# Patient Record
Sex: MALE | Race: WHITE | HISPANIC OR LATINO | URBAN - NONMETROPOLITAN AREA
[De-identification: names, ages, dates, MRNs, and addresses within clinical notes are randomized per-mention and may not be internally consistent; named-entity substitution may affect disease eponyms.]

---

## 2023-08-09 ENCOUNTER — APPOINTMENT (OUTPATIENT)
Dept: RADIOLOGY | Facility: CLINIC | Age: 28
End: 2023-08-09
Payer: COMMERCIAL

## 2023-08-09 ENCOUNTER — OFFICE VISIT (OUTPATIENT)
Dept: URGENT CARE | Facility: CLINIC | Age: 28
End: 2023-08-09
Payer: COMMERCIAL

## 2023-08-09 ENCOUNTER — HOSPITAL ENCOUNTER (EMERGENCY)
Facility: HOSPITAL | Age: 28
Discharge: HOME | End: 2023-08-09
Attending: EMERGENCY MEDICINE

## 2023-08-09 VITALS
WEIGHT: 170 LBS | HEART RATE: 79 BPM | RESPIRATION RATE: 18 BRPM | OXYGEN SATURATION: 99 % | TEMPERATURE: 98.3 F | DIASTOLIC BLOOD PRESSURE: 68 MMHG | SYSTOLIC BLOOD PRESSURE: 124 MMHG

## 2023-08-09 VITALS
SYSTOLIC BLOOD PRESSURE: 124 MMHG | DIASTOLIC BLOOD PRESSURE: 71 MMHG | HEART RATE: 78 BPM | WEIGHT: 170 LBS | TEMPERATURE: 98.6 F

## 2023-08-09 DIAGNOSIS — S61.209A FINGER AVULSION, INITIAL ENCOUNTER: Primary | ICD-10-CM

## 2023-08-09 DIAGNOSIS — T14.8XXA AVULSION INJURY: Primary | ICD-10-CM

## 2023-08-09 DIAGNOSIS — S61.209A FINGER AVULSION, INITIAL ENCOUNTER: ICD-10-CM

## 2023-08-09 PROCEDURE — 90471 IMMUNIZATION ADMIN: CPT

## 2023-08-09 PROCEDURE — G0382 LEV 3 HOSP TYPE B ED VISIT: HCPCS

## 2023-08-09 PROCEDURE — 73140 X-RAY EXAM OF FINGER(S): CPT

## 2023-08-09 PROCEDURE — 90715 TDAP VACCINE 7 YRS/> IM: CPT

## 2023-08-09 PROCEDURE — 96372 THER/PROPH/DIAG INJ SC/IM: CPT

## 2023-08-09 RX ORDER — LIDOCAINE HYDROCHLORIDE AND EPINEPHRINE 10; 10 MG/ML; UG/ML
5 INJECTION, SOLUTION INFILTRATION; PERINEURAL ONCE
Status: COMPLETED | OUTPATIENT
Start: 2023-08-09 | End: 2023-08-09

## 2023-08-09 RX ORDER — CEFAZOLIN SODIUM 2 G/50ML
2000 SOLUTION INTRAVENOUS ONCE
Status: COMPLETED | OUTPATIENT
Start: 2023-08-09 | End: 2023-08-09

## 2023-08-09 RX ORDER — CEPHALEXIN 500 MG/1
500 CAPSULE ORAL 4 TIMES DAILY
Qty: 28 CAPSULE | Refills: 0 | Status: SHIPPED | OUTPATIENT
Start: 2023-08-09 | End: 2023-08-16

## 2023-08-09 RX ORDER — KETOROLAC TROMETHAMINE 30 MG/ML
30 INJECTION, SOLUTION INTRAMUSCULAR; INTRAVENOUS ONCE
Status: COMPLETED | OUTPATIENT
Start: 2023-08-09 | End: 2023-08-09

## 2023-08-09 RX ADMIN — CEFAZOLIN SODIUM 2000 MG: 2 SOLUTION INTRAVENOUS at 18:03

## 2023-08-09 RX ADMIN — LIDOCAINE HYDROCHLORIDE,EPINEPHRINE BITARTRATE 5 ML: 10; .01 INJECTION, SOLUTION INFILTRATION; PERINEURAL at 16:24

## 2023-08-09 RX ADMIN — KETOROLAC TROMETHAMINE 30 MG: 30 INJECTION, SOLUTION INTRAMUSCULAR; INTRAVENOUS at 15:11

## 2023-08-09 NOTE — ED PROVIDER NOTES
History  Chief Complaint   Patient presents with   • Finger Laceration     Patient was sharpening a knife when he cut his hand. Seen at urgent care and sent here for an avulsion. 71-year-old otherwise healthy RHD male sent over from urgent care with an avulsion injury over the dorsum of his left index MCP; underwent plain x-ray evaluation given a IM dose of Toradol as well as tetanus was updated. He was sharpening a knife utilizing his right hand when it slipped sustaining an injury. Patient does complain of weakness with movement of his finger he has no numbness or paresthesias denies any other injury and is otherwise been in his usual state of health. Prior to Admission Medications   Prescriptions: None   Facility-Administered Medications Last Administration Doses Remaining   ketorolac (TORADOL) injection 30 mg 8/9/2023  3:11 PM 0          History reviewed. No pertinent past medical history. History reviewed. No pertinent surgical history. History reviewed. No pertinent family history. I have reviewed and agree with the history as documented. E-Cigarette/Vaping   • E-Cigarette Use Never User      E-Cigarette/Vaping Substances     Social History     Tobacco Use   • Smoking status: Never   • Smokeless tobacco: Never   Vaping Use   • Vaping Use: Never used   Substance Use Topics   • Drug use: Never       Review of Systems   Constitutional: Negative for activity change and fever. Skin: Positive for wound (left index MCP dorsum). Neurological: Positive for weakness. Negative for numbness. All other systems reviewed and are negative. Physical Exam  Physical Exam  Vitals and nursing note reviewed. Constitutional:       General: He is not in acute distress. Appearance: He is not ill-appearing, toxic-appearing or diaphoretic.    Musculoskeletal:      Comments: Left dorsum of hand see media tabs for injury and repar; patient has a 3 cm circumferential avulsion of skin as well as a 2 cm workup circumferential  avulsion of what appears to be joint capsule. Brisk cap refill to distal aspect of the index finger, and less than 4 mm 2pt discrimination intact to radial and ulnar aspect of digit. After the wound was anesthetized using lidocaine patient was able extend the index finger against resistance make a peace sign indicating intact lumbricals; FDP FDS were isolated found to be intact he can make an okay sign without difficulty;    Skin:     Capillary Refill: Capillary refill takes less than 2 seconds. Neurological:      General: No focal deficit present. Mental Status: He is alert. Cranial Nerves: No cranial nerve deficit. Sensory: No sensory deficit. Motor: No weakness. Coordination: Coordination normal.      Gait: Gait normal.   Psychiatric:         Mood and Affect: Mood normal.         Vital Signs  ED Triage Vitals [08/09/23 1605]   Temperature Pulse Respirations Blood Pressure SpO2   98.3 °F (36.8 °C) 79 18 124/68 99 %      Temp Source Heart Rate Source Patient Position - Orthostatic VS BP Location FiO2 (%)   Temporal Monitor Sitting Left arm --      Pain Score       10 - Worst Possible Pain           Vitals:    08/09/23 1605   BP: 124/68   Pulse: 79   Patient Position - Orthostatic VS: Sitting         Visual Acuity      ED Medications  Medications   lidocaine-epinephrine (XYLOCAINE/EPINEPHRINE) 1 %-1:100,000 injection 5 mL (5 mL Infiltration Given 8/9/23 1624)   ceFAZolin (ANCEF) IVPB (premix in dextrose) 2,000 mg 50 mL (0 mg Intravenous Stopped 8/9/23 1931)       Diagnostic Studies  Results Reviewed     None                 No orders to display              Procedures  Universal Protocol:  Consent: Verbal consent obtained.   Risks and benefits: risks, benefits and alternatives were discussed  Consent given by: patient  Patient understanding: patient states understanding of the procedure being performed  Patient identity confirmed: verbally with patient and arm band    Laceration repair    Date/Time: 8/9/2023 4:40 PM    Performed by: Juan Roach MD  Authorized by: Juan Roach MD  Body area: upper extremity  Location details: left index finger  Laceration length: 3 cm  Anesthesia: local infiltration    Anesthesia:  Local Anesthetic: lidocaine 1% with epinephrine  Anesthetic total: 5 mL    Sedation:  Patient sedated: no        Procedure Details:  Preparation: Patient was prepped and draped in the usual sterile fashion. Irrigation solution: saline  Irrigation method: syringe  Amount of cleaning: extensive (500ml sterile water)  Debridement: none  Degree of undermining: none  Skin closure: 5-0 nylon  Wound fascia closure material used: 6-0 for securing joint capsule 4 sutures. Number of sutures: 7  Technique: vertical mattress  Approximation: close  Approximation difficulty: complex  Dressing: splint and gauze roll (xeroform)  Patient tolerance: patient tolerated the procedure well with no immediate complications  Comments: For dressing: xeroform dressing with 4x4 was placed easton wrapped to long finger in extension with volar splint               ED Course  ED Course as of 08/10/23 0347   Wed Aug 09, 2023   1748 TC/discussion Dr. Ana Paula Lovett of Hand surgery recommends outpatient followup will have hand  reach out to patient. Will place ambulatory referral    1289 Extensively reviewed discharge instructions with patient utilizing  [id number] reviewed importance of hand follow up need to leave index and long finger in extension as to not stress the laceration dressing changes (supplies provided) and cephalexin as outpt and reason for return patient verbalized understanding                               SBIRT 22yo+    Flowsheet Row Most Recent Value   Initial Alcohol Screen: US AUDIT-C     1.  How often do you have a drink containing alcohol? 0 Filed at: 08/09/2023 1605   Audit-C Score 0 Filed at: 08/09/2023 1605   ARACELY: How many times in the past year have you. .. Used an illegal drug or used a prescription medication for non-medical reasons? Never Filed at: 08/09/2023 160                    Medical Decision Making  Mdm: 78-year-old male with avulsion injury to the overlying his left index MCP. Patient involves the skin as well as what appears to be joint capsule he is neurovascularly intact reviewed radiographs from urgent care there is no evidence of fracture or radiopaque foreign body. Reviewed the urgent care note which was incomplete. Patient also had his tetanus updated and received Toradol IM injection. Will secure the appears to be  joint capsule with 6-0 Vicryl. No evidence of foreign body was in the wound after irrigation. Skin was closed separately using 5-0 sutures. Will have patient complete a 2g dose of cefazolin IV and d/w hand surgery     Brain [id number] was utilized to review findings after his laceration repair and to obtain history. Discussed that I recommend follow-up with hand surgery as it is difficult to know whether tacking down the fibrous joint capsule will actually take in the hand surgeon may need to reopen the wound to reevaluated or resutured the area. Patient verbalized understanding of the need for close follow-up and recommendation to keep the digit in an extension as to not stress the wound and dislodged the avulsed fragment    Risk  Prescription drug management. Disposition  Final diagnoses:   Avulsion injury - Left index finger dorsum MCP with complex repair      Time reflects when diagnosis was documented in both MDM as applicable and the Disposition within this note     Time User Action Codes Description Comment    8/9/2023  5:51 PM Marcella Morgan. 8XXA] Avulsion injury     8/9/2023  5:52 PM Leda Petit Modify [A77. 8XXA] Avulsion injury Left index finger dorsum MCP with complex repair       ED Disposition     ED Disposition   Discharge    Condition   Stable Date/Time   Wed Aug 9, 2023  5:50 PM    Comment   Jerson Lilly discharge to home/self care.                Follow-up Information     Follow up With Specialties Details Why Contact Info    Jennifer Elkins MD Orthopedic Surgery, Hand Surgery  expect call from this office for futher followup tomrrow or next day [address]  [address] [place] [address] Road  [phone]            Discharge Medication List as of 8/9/2023  6:30 PM      START taking these medications    Details   cephalexin (KEFLEX) 500 mg capsule Take 1 capsule (500 mg total) by mouth 4 (four) times a day for 7 days, Starting Wed 8/9/2023, Until Wed 8/16/2023, Normal                 PDMP Review     None          ED Provider  Electronically Signed by           Christiane Brower MD  08/10/23 8037

## 2023-08-09 NOTE — DISCHARGE INSTRUCTIONS
OK to leave dressing on for 24 hours  Otherwise place yellow patch on wound then 4x4 on top wrap to next finger with gauze and spint on palm side to keep index and middle finger straight  Keep wound clean and dry  Take 7 days of antibiotic (cephalexin)  Return with increased redness pus drainage or any new or worsening symptoms

## 2023-08-09 NOTE — PROGRESS NOTES
[facility]        NAME: Harper Spurling is a 32 y.o. male  : 1995    MRN: [id number]  DATE: 2023  TIME: 5:05 PM    Assessment and Plan   Finger avulsion, initial encounter [S61.209A]  1. Finger avulsion, initial encounter  Tdap Vaccine greater than or equal to 8yo    ketorolac (TORADOL) injection 30 mg    XR finger left second digit-index    Transfer to other facility        Tdap updated in office. Toradol for pain. Patient transferred to the emergency department for further evaluation and repair of complicated laceration involving an avulsion of the knuckle of the second digit of the left hand with possible tendon involvement and tissue hanging outside of the laceration (see media). Patient may benefit from hand surgery consultation as patient cannot bend finger and suspect tendon involvement. Wound was dressed with a wet-to-dry sterile dressing. Pt refused , prefers friend to translate    Patient Instructions     Proceed to  ER     Chief Complaint     Chief Complaint   Patient presents with   • Laceration     Laceration to left hand cut hand with knife, is feeling dizzy          History of Present Illness       Patient is a 59-year-old male who presents to the office today for a laceration to the DIP of the second digit of the left hand. He states that approximately 30 minutes prior to arrival he was cutting vegetables with a  knife when he cut his finger. He is unsure of his tetanus status and is in severe pain. He denies any blood thinners. He is unable to flex the finger      Review of Systems   Review of Systems   Constitutional: Negative for chills and fever. Musculoskeletal: Positive for arthralgias. Skin: Positive for wound. All other systems reviewed and are negative. Current Medications     No current facility-administered medications for this visit. No current outpatient medications on file.     Current Allergies     Allergies as of 08/09/2023   • (No Known Allergies)            The following portions of the patient's history were reviewed and updated as appropriate: allergies, current medications, past family history, past medical history, past social history, past surgical history and problem list.     History reviewed. No pertinent past medical history. History reviewed. No pertinent surgical history. History reviewed. No pertinent family history. Medications have been verified. Objective   /71   Pulse 78   Temp 98.6 °F (37 °C)   Wt 77.1 kg (170 lb)        Physical Exam     Physical Exam  Vitals and nursing note reviewed. Constitutional:       General: He is not in acute distress. Appearance: Normal appearance. He is normal weight. He is not ill-appearing or toxic-appearing. Cardiovascular:      Rate and Rhythm: Normal rate and regular rhythm. Pulses: Normal pulses. Heart sounds: Normal heart sounds. Pulmonary:      Effort: Pulmonary effort is normal.      Breath sounds: Normal breath sounds. Musculoskeletal:         General: Tenderness, deformity and signs of injury present. Left hand: Deformity, laceration, tenderness and bony tenderness present. Decreased range of motion. Normal sensation. There is no disruption of two-point discrimination. Normal capillary refill. Normal pulse. Hands:       Comments: Cap refill less than 2 seconds. Sensation intact. Skin:     General: Skin is warm. Capillary Refill: Capillary refill takes less than 2 seconds. Neurological:      General: No focal deficit present. Mental Status: He is alert.

## 2023-08-09 NOTE — Clinical Note
Juan Smith was seen and treated in our emergency department on 8/9/2023. No work until cleared by Family Doctor/Orthopedics        Diagnosis:     Karan Rose  . He may return on this date: If you have any questions or concerns, please don't hesitate to call.       Alvaro Mariscal MD    ______________________________           _______________          _______________  Hospital Representative                              Date                                Time

## 2023-08-10 ENCOUNTER — TELEPHONE (OUTPATIENT)
Age: 28
End: 2023-08-10

## 2023-08-10 NOTE — TELEPHONE ENCOUNTER
Patient is being referred to a orthopedics. Please schedule accordingly.     [address] [facility]   [phone]